# Patient Record
Sex: FEMALE | NOT HISPANIC OR LATINO | URBAN - METROPOLITAN AREA
[De-identification: names, ages, dates, MRNs, and addresses within clinical notes are randomized per-mention and may not be internally consistent; named-entity substitution may affect disease eponyms.]

---

## 2023-07-21 ENCOUNTER — OFFICE VISIT (OUTPATIENT)
Dept: PRIMARY CARE | Facility: CLINIC | Age: 33
End: 2023-07-21
Payer: COMMERCIAL

## 2023-07-21 VITALS
RESPIRATION RATE: 18 BRPM | OXYGEN SATURATION: 100 % | HEIGHT: 56 IN | SYSTOLIC BLOOD PRESSURE: 98 MMHG | TEMPERATURE: 98 F | WEIGHT: 100.6 LBS | DIASTOLIC BLOOD PRESSURE: 68 MMHG | BODY MASS INDEX: 22.63 KG/M2

## 2023-07-21 DIAGNOSIS — M79.675 GREAT TOE PAIN, LEFT: ICD-10-CM

## 2023-07-21 DIAGNOSIS — M67.442 GANGLION CYST OF FINGER OF LEFT HAND: Primary | ICD-10-CM

## 2023-07-21 PROCEDURE — 99214 OFFICE O/P EST MOD 30 MIN: CPT | Performed by: FAMILY MEDICINE

## 2023-07-21 PROCEDURE — 1036F TOBACCO NON-USER: CPT | Performed by: FAMILY MEDICINE

## 2023-07-21 NOTE — PROGRESS NOTES
"Subjective   Patient ID: Zainab Urrutia is a 33 y.o. female who presents for Foot Injury (BUMP ON LEFT FOOT, INJURED A YEAR AGO. ) and FINGER LUMP (LEFT MIDDLE FINGER MAYBE 3 WEEKS DURATION, CAN HAVE PAIN WHEN BUMPED.).    HPI   Noticed a bump at the base of her left hand, 3rd digit.  Painful to touch. Does a lot with her hands.  Feels like causing pain in the finger especially when she bends it.      About a year ago she bent her left great toe backwards when she was doing martial arts.  It was very painful, bruised and swollen and never had it evaluated.  Since that time continues to have discomfort of the toe, swelling.  Reports that certain shoes make it hurt more.    Review of Systems  Negative unless noted in HPI    Objective   BP 98/68 (BP Location: Left arm, Patient Position: Sitting)   Temp 36.7 °C (98 °F) (Oral)   Resp 18   Ht 1.429 m (4' 8.26\")   Wt 45.6 kg (100 lb 9.6 oz)   SpO2 100%   BMI 22.35 kg/m²     Physical Exam  Constitutional:       Appearance: Normal appearance.   Musculoskeletal:      Comments: On hand, dorsal aspect, third digit at MCP joint there is a firm, round lesion    Left 1st great toe, tenderness at the DIP joint with ROM or with compression, minimal erythema   Neurological:      Mental Status: She is alert.         Assessment/Plan   Problem List Items Addressed This Visit       Ganglion cyst of finger of left hand - Primary     Exam consistent with cystic structure  Discussed cysts, potential for it to go down   Pt elects to have referral for hand specialist, referral provided         Relevant Orders    Referral to Orthopaedic Surgery    Great toe pain, left     Possible previous fracture with poor or inappropriate healing  Obtain xray  Discussed other consideration is for ongoing tendinitis, discussed conservative treatment with icing, gentle ROM, comfortable shoes  Can refer to podiatry if persistent               "

## 2023-07-21 NOTE — PATIENT INSTRUCTIONS
Referral to OB/GYN       [facility]  [address]  [address]  [place] [id number] [phone]

## 2023-07-21 NOTE — ASSESSMENT & PLAN NOTE
Possible previous fracture with poor or inappropriate healing  Obtain xray  Discussed other consideration is for ongoing tendinitis, discussed conservative treatment with icing, gentle ROM, comfortable shoes  Can refer to podiatry if persistent

## 2023-07-21 NOTE — ASSESSMENT & PLAN NOTE
Exam consistent with cystic structure  Discussed cysts, potential for it to go down   Pt elects to have referral for hand specialist, referral provided

## 2024-06-27 ENCOUNTER — OFFICE VISIT (OUTPATIENT)
Dept: PRIMARY CARE | Facility: CLINIC | Age: 34
End: 2024-06-27
Payer: COMMERCIAL

## 2024-06-27 VITALS
BODY MASS INDEX: 20.7 KG/M2 | TEMPERATURE: 97.5 F | DIASTOLIC BLOOD PRESSURE: 78 MMHG | HEART RATE: 77 BPM | SYSTOLIC BLOOD PRESSURE: 120 MMHG | OXYGEN SATURATION: 100 % | WEIGHT: 98.6 LBS | HEIGHT: 58 IN

## 2024-06-27 DIAGNOSIS — L30.9 DERMATITIS: ICD-10-CM

## 2024-06-27 DIAGNOSIS — N64.4 BREAST PAIN, RIGHT: Primary | ICD-10-CM

## 2024-06-27 PROCEDURE — 1036F TOBACCO NON-USER: CPT | Performed by: FAMILY MEDICINE

## 2024-06-27 PROCEDURE — 99214 OFFICE O/P EST MOD 30 MIN: CPT | Performed by: FAMILY MEDICINE

## 2024-06-27 RX ORDER — TRIAMCINOLONE ACETONIDE 1 MG/G
OINTMENT TOPICAL 2 TIMES DAILY PRN
Qty: 80 G | Refills: 0 | Status: SHIPPED | OUTPATIENT
Start: 2024-06-27 | End: 2024-10-25

## 2024-06-27 ASSESSMENT — ENCOUNTER SYMPTOMS
BREAST PAIN: 1
LOSS OF SENSATION IN FEET: 0
OCCASIONAL FEELINGS OF UNSTEADINESS: 0
DEPRESSION: 0

## 2024-06-27 ASSESSMENT — PATIENT HEALTH QUESTIONNAIRE - PHQ9
2. FEELING DOWN, DEPRESSED OR HOPELESS: NOT AT ALL
1. LITTLE INTEREST OR PLEASURE IN DOING THINGS: NOT AT ALL
SUM OF ALL RESPONSES TO PHQ9 QUESTIONS 1 AND 2: 0

## 2024-06-27 ASSESSMENT — PAIN SCALES - GENERAL: PAINLEVEL: 0-NO PAIN

## 2024-06-27 NOTE — PROGRESS NOTES
"Subjective   Patient ID: Zainab Urrutia is a 34 y.o. female who presents for Breast Pain.    Breast Pain  Associated Symptoms: breast pain       Presents for right breast pain of one week duration.  Very sensitive to any touch.  No masses, discharge or skin abnormality palpated. Menstrual cycle was 2 weeks ago.  No chance of pregnancy per patient. No increased heavy lifting or movement, is right handed.  Occasional breast pain at other times.      Dermatitis in the elbow creases, pruritic with patchy appearance.  Can happen in other areas too.  Review of Systems  Negative unless noted in HPI    Objective   /78 (BP Location: Right arm, Patient Position: Sitting, BP Cuff Size: Small adult)   Pulse 77   Temp 36.4 °C (97.5 °F) (Temporal)   Ht 1.473 m (4' 10\")   Wt (!) 44.7 kg (98 lb 9.6 oz)   LMP 06/12/2024 (Approximate)   SpO2 100%   BMI 20.61 kg/m²     Physical Exam  Constitutional:       Appearance: Normal appearance.   Chest:   Breasts:     Right: Tenderness (diffuse) present. No mass, nipple discharge or skin change.   Lymphadenopathy:      Upper Body:      Right upper body: No axillary adenopathy.   Skin:     Findings: Rash present.   Neurological:      Mental Status: She is alert.         Assessment/Plan   Problem List Items Addressed This Visit             ICD-10-CM    Breast pain, right - Primary N64.4     Likely hormonal in nature  Due to significant symptoms will obtain ultrasound  Discussed preventative measures including avoiding excessive caffeine, can use cool compresses  If any change then return for reevaluation         Relevant Orders    BI US breast complete right    Dermatitis L30.9     Suspect atopic dermatitis  Send triamcinolone cream         Relevant Medications    triamcinolone (Kenalog) 0.1 % ointment          "

## 2024-06-27 NOTE — ASSESSMENT & PLAN NOTE
Likely hormonal in nature  Due to significant symptoms will obtain ultrasound  Discussed preventative measures including avoiding excessive caffeine, can use cool compresses  If any change then return for reevaluation

## 2024-07-05 ENCOUNTER — HOSPITAL ENCOUNTER (OUTPATIENT)
Dept: RADIOLOGY | Facility: CLINIC | Age: 34
Discharge: HOME | End: 2024-07-05
Payer: COMMERCIAL

## 2024-07-05 VITALS — WEIGHT: 98.55 LBS | HEIGHT: 58 IN | BODY MASS INDEX: 20.69 KG/M2

## 2024-07-05 DIAGNOSIS — N64.4 BREAST PAIN, RIGHT: ICD-10-CM

## 2024-07-05 PROCEDURE — 77062 BREAST TOMOSYNTHESIS BI: CPT

## 2024-07-05 PROCEDURE — 76642 ULTRASOUND BREAST LIMITED: CPT | Mod: RT

## 2024-07-05 PROCEDURE — 76983 USE EA ADDL TARGET LESION: CPT | Mod: RT

## 2024-07-08 DIAGNOSIS — N64.4 BREAST PAIN, RIGHT: Primary | ICD-10-CM

## 2024-07-22 ENCOUNTER — APPOINTMENT (OUTPATIENT)
Dept: OBSTETRICS AND GYNECOLOGY | Facility: CLINIC | Age: 34
End: 2024-07-22
Payer: COMMERCIAL

## 2024-07-22 VITALS
HEIGHT: 59 IN | WEIGHT: 99 LBS | SYSTOLIC BLOOD PRESSURE: 109 MMHG | BODY MASS INDEX: 19.96 KG/M2 | DIASTOLIC BLOOD PRESSURE: 71 MMHG

## 2024-07-22 DIAGNOSIS — Z01.419 WELL WOMAN EXAM WITH ROUTINE GYNECOLOGICAL EXAM: Primary | ICD-10-CM

## 2024-07-22 PROCEDURE — 99385 PREV VISIT NEW AGE 18-39: CPT | Performed by: NURSE PRACTITIONER

## 2024-07-22 PROCEDURE — 3008F BODY MASS INDEX DOCD: CPT | Performed by: NURSE PRACTITIONER

## 2024-07-22 PROCEDURE — 1036F TOBACCO NON-USER: CPT | Performed by: NURSE PRACTITIONER

## 2024-07-22 ASSESSMENT — ENCOUNTER SYMPTOMS
EYES NEGATIVE: 0
CARDIOVASCULAR NEGATIVE: 0
ENDOCRINE NEGATIVE: 0
MUSCULOSKELETAL NEGATIVE: 0
HEMATOLOGIC/LYMPHATIC NEGATIVE: 0
LOSS OF SENSATION IN FEET: 0
RESPIRATORY NEGATIVE: 0
CONSTITUTIONAL NEGATIVE: 0
DEPRESSION: 0
PSYCHIATRIC NEGATIVE: 0
GASTROINTESTINAL NEGATIVE: 0
NEUROLOGICAL NEGATIVE: 0
ALLERGIC/IMMUNOLOGIC NEGATIVE: 0
OCCASIONAL FEELINGS OF UNSTEADINESS: 0

## 2024-07-22 ASSESSMENT — PAIN SCALES - GENERAL: PAINLEVEL: 0-NO PAIN

## 2024-07-22 ASSESSMENT — LIFESTYLE VARIABLES
SKIP TO QUESTIONS 9-10: 1
HOW MANY STANDARD DRINKS CONTAINING ALCOHOL DO YOU HAVE ON A TYPICAL DAY: PATIENT DOES NOT DRINK
AUDIT-C TOTAL SCORE: 0
HOW OFTEN DO YOU HAVE SIX OR MORE DRINKS ON ONE OCCASION: NEVER
HOW OFTEN DO YOU HAVE A DRINK CONTAINING ALCOHOL: NEVER

## 2024-07-22 NOTE — PROGRESS NOTES
"Sanaz Knapp, APRN-CNP     Subjective   Zainab Urrutia is a 34 y.o. female who presents for annual exam.   Patient is a 34-year-old G0, P0 new to the practice here for an annual checkup.  Patient has not had a GYN exam in many years.  Patient is  and her  is considerably older than she is.  He has 1 child and does not wish to have anymore however she would like to have a child.    Her medical history and surgical history are unremarkable.    Patient is recently had a mammogram due to breast discomfort.  She is having a repeat mammogram in 6 months.  No past medical history on file.  No past surgical history on file.    OB History          0    Para   0    Term   0            AB        Living             SAB        IAB        Ectopic        Multiple        Live Births                   Patient's last menstrual period was 2024 (approximate).      Review of Systems   All other systems reviewed and are negative.    Breast: Pain   Vaginal: No Complaints        Objective   /71   Ht 1.499 m (4' 11\")   Wt (!) 44.9 kg (99 lb)   LMP 2024 (Approximate)   BMI 20.00 kg/m²   Physical Exam  Constitutional:       Appearance: Normal appearance. She is normal weight.   Genitourinary:      Vulva, rectum and urethral meatus normal.      Genitourinary Comments: Bilateral fibrocystic changes      Right Labia: No rash.     Left Labia: No rash.     No vaginal discharge.      No vaginal prolapse present.     No vaginal atrophy present.       Right Adnexa: no mass present.     Left Adnexa: no mass present.     Cervix is nulliparous.      No cervical motion tenderness.      Uterus is not enlarged.   Breasts:     Right: Normal.      Left: Normal.   Cardiovascular:      Rate and Rhythm: Normal rate.      Heart sounds: Normal heart sounds.   Pulmonary:      Effort: Pulmonary effort is normal.      Breath sounds: Normal breath sounds.   Abdominal:      Palpations: Abdomen is soft. "   Musculoskeletal:         General: Normal range of motion.      Cervical back: Normal range of motion.   Neurological:      General: No focal deficit present.      Mental Status: She is alert.   Skin:     General: Skin is warm and dry.   Vitals and nursing note reviewed.                   Assessment/Plan   Problem List Items Addressed This Visit    None  Visit Diagnoses         Codes    Well woman exam with routine gynecological exam    -  Primary Z01.419    Relevant Orders    THINPREP PAP TEST
